# Patient Record
Sex: MALE | Race: WHITE | NOT HISPANIC OR LATINO | Employment: OTHER | URBAN - METROPOLITAN AREA
[De-identification: names, ages, dates, MRNs, and addresses within clinical notes are randomized per-mention and may not be internally consistent; named-entity substitution may affect disease eponyms.]

---

## 2023-09-22 DIAGNOSIS — D35.2 PITUITARY MACROADENOMA: Primary | ICD-10-CM

## 2023-09-28 ENCOUNTER — TELEPHONE (OUTPATIENT)
Dept: NEUROSURGERY | Facility: CLINIC | Age: 73
End: 2023-09-28
Payer: MEDICARE

## 2023-09-28 NOTE — TELEPHONE ENCOUNTER
The patient needs formal eye examination with VF testing.  Try to get that quick.  He will need to see Jackson soon, but try for after the eye examination.  His family physician did the pituitary labwork.

## 2023-10-03 DIAGNOSIS — D35.2 PITUITARY MACROADENOMA: Primary | ICD-10-CM

## 2023-10-03 NOTE — TELEPHONE ENCOUNTER
Patient does yearly eye exams with Dr. Guillen in [place] [phone] # [phone].  He is already scheduled for his yearly eye exam on 11/13/23 and they can add the visual field then.  I will fax them the order.  Insurance wont pay for him to go sooner.  Also he would like to do a virtual appointment with Dr. Jackson.  Is this ok and can it be scheduled after his eye exam on 11/13/23?  Please advise...BH

## 2023-10-03 NOTE — TELEPHONE ENCOUNTER
We need them to interpret the VF, not just send the diagram.    Will have to ask Dr. Jackson about TM.  I can send him a staff message.   Resulted

## 2023-10-25 NOTE — TELEPHONE ENCOUNTER
I was able to schedule the VF for 11/13/23 at 1:30.  Due to transportation the patient can not go sooner.  Dr. Jackson was fine with doing a telemed visit for the first appointment.  I scheduled him for a telemed visit with Dr. Jackson on 11/22/23 at 10:00.  I walked him through the telemed process through the [facility], but he will need a refresher the day of.  I also SAWANT for Dr. Gay's nurse to fax over the MRI report.

## 2023-11-16 RX ORDER — AMLODIPINE BESYLATE 5 MG/1
5 TABLET ORAL EVERY MORNING
COMMUNITY
Start: 2023-09-11

## 2023-11-16 RX ORDER — LOSARTAN POTASSIUM 100 MG/1
100 TABLET ORAL
COMMUNITY
Start: 2023-09-07

## 2023-11-16 RX ORDER — ATORVASTATIN CALCIUM 80 MG/1
80 TABLET, FILM COATED ORAL NIGHTLY
COMMUNITY
Start: 2023-09-07

## 2023-11-16 RX ORDER — METOPROLOL SUCCINATE 25 MG/1
25 TABLET, EXTENDED RELEASE ORAL
COMMUNITY
Start: 2023-09-07

## 2023-11-16 RX ORDER — MECLIZINE HYDROCHLORIDE 25 MG/1
25 TABLET ORAL
COMMUNITY
Start: 2023-06-04

## 2023-11-22 ENCOUNTER — OFFICE VISIT (OUTPATIENT)
Dept: NEUROSURGERY | Facility: CLINIC | Age: 73
End: 2023-11-22
Payer: MEDICARE

## 2023-11-22 VITALS — WEIGHT: 175 LBS | BODY MASS INDEX: 25.05 KG/M2 | HEIGHT: 70 IN

## 2023-11-22 DIAGNOSIS — D35.2 PITUITARY MACROADENOMA: Primary | ICD-10-CM

## 2023-11-22 PROCEDURE — 99204 OFFICE O/P NEW MOD 45 MIN: CPT | Mod: 95,,, | Performed by: STUDENT IN AN ORGANIZED HEALTH CARE EDUCATION/TRAINING PROGRAM

## 2023-11-22 PROCEDURE — 99204 PR OFFICE/OUTPT VISIT, NEW, LEVL IV, 45-59 MIN: ICD-10-PCS | Mod: 95,,, | Performed by: STUDENT IN AN ORGANIZED HEALTH CARE EDUCATION/TRAINING PROGRAM

## 2023-11-22 RX ORDER — ZOLPIDEM TARTRATE 10 MG/1
10 TABLET ORAL NIGHTLY
COMMUNITY
Start: 2023-10-30

## 2023-12-26 NOTE — PROGRESS NOTES
[facility]  History & Physical  Neurosurgery      Zeke Mayfield   [id number]   1950     This is a telemedicine note.  The patient was treated using telemedicine with real-time audio and video, according to [facility] protocols.  I, a distant provider, conducted the visit from my office.  The patient participated in the visit at their home in [place].  I am licensed in the state where the patient stated they are located.  The patient (or patient's representative) stated that they understood and accepted the privacy and security risks today information other location.     SUBJECTIVE:     Chief Complaint:  Incidental finding pituitary lesion    History of Present Illness:   Patient is a 73 y.o. male seen today for pituitary lesion.  He had an episode of dizziness while getting the mail back in June.  Full workup was done including MRI brain.  Pituitary lesion was found.  Denies any headaches, visual disturbances or new episodes of dizziness.  He feels his balance is off at times.  Saw an eye doctor for formal visual field assessment and is scheduled to see them back in 4 months.  Establishing care with Endocrine in December.    Past Medical History:   Diagnosis Date    Basal cell carcinoma     Cataracts, bilateral     Epididymitis     HTN (hypertension)     Hyperlipidemia     Insomnia     Left retinal detachment     Tobacco dependence       Past Surgical History:   Procedure Laterality Date    CATARACT EXTRACTION, BILATERAL      INGUINAL HERNIA REPAIR Left 2009    KNEE ARTHROSCOPY Left 2004    NASAL SEPTUM SURGERY      REPAIR, ANEURYSM, ARTERY, CELIAC, HEPATIC, MESENTERIC, OR RENAL      REPAIR, RETINAL DETACHMENT Left     SHOULDER SURGERY Left 2004    VENTRAL HERNIA REPAIR Left 2009      Social History     Tobacco Use    Smoking status: Never    Smokeless tobacco: Never   Substance Use Topics    Alcohol use: Not on file      Family History   Problem Relation Age of Onset    Heart disease Father  "    Hypertension Father     Hyperlipidemia Sister     Aortic aneurysm Sister       Review of patient's allergies indicates:  No Known Allergies     Current Outpatient Medications   Medication Instructions    amLODIPine (NORVASC) 5 mg, Oral, Every morning    atorvastatin (LIPITOR) 80 mg, Oral, Nightly    losartan (COZAAR) 100 mg, Oral    meclizine (ANTIVERT) 25 mg, Oral    metoprolol succinate (TOPROL-XL) 25 mg, Oral    zolpidem (AMBIEN) 10 mg, Oral, Nightly      Review of Systems  12 point review of systems conducted, negative except as stated in the history of present illness. See HPI for details.    OBJECTIVE:     Visit Vitals  Ht 5' 10" (1.778 m)   Wt 79.4 kg (175 lb)   BMI 25.11 kg/m²      Physical exam was deferred    Imaging:  All pertinent neuroimaging independently reviewed. These findings were discussed in detail with the patient.     MRI pituitary from December 18, 2023 demonstrates grossly stable pituitary mass when compared to June 2023 causing mass effect on optic chiasm.  There is leftward displacement of the infundibulum and partial encasement of the right distal internal carotid artery.    I reviewed visual field study from October 30th and I do not appreciate any gross visual field deficits.    DIAGNOSES:       ICD-10-CM ICD-9-CM   1. Pituitary macroadenoma  D35.2 227.3     ASSESSMENT/PLAN:     73-year-old male incidentally found to have a pituitary lesion.  Awaiting assessment by endocrinology.  Was seen by Ophthalmology and they found no visual field deficits.  We discussed pituitary tumors in general and the natural history.  I recommended close monitoring with repeat MRI pituitary with and with the contrast in 3 months.  He is scheduled for repeat visual fields in about 4 months and I will see him in the clinic afterwards.  He knows to call in the meantime with any questions or concerns.    Orders  -     MRI Pituitary W W/O Contrast; Future; Expected date: 11/22/2023    Follow up in about 4 " months (around 3/22/2024) for with MD, Follow-up, To review imaging.    Marquis Jackson MD  Neurosurgery  [facility]     45 minutes were personally spent on this visit including my review of available records from referring physician, prior imaging, comprehensive physical and neurologic examination and discussion with the patient.     Disclaimer:  This note is prepared using voice recognition software and as such is likely to have errors despite attempts at proofreading.

## 2024-03-21 ENCOUNTER — TELEPHONE (OUTPATIENT)
Dept: NEUROSURGERY | Facility: CLINIC | Age: 74
End: 2024-03-21
Payer: MEDICARE

## 2024-03-21 NOTE — TELEPHONE ENCOUNTER
I called pt to advise about his MRI B and visual field testing being done before his virtual appt 3/25/24 with SM. Pt stated he did get his [facility] B done [facility] in [place] at the end of February. Pt stated he was supposed to mail a disc of the images but he had forgotten. I gave pt the address to mail the disc to office. I also asked pt about his appt for his visual field testing on 3/7/24 at [facility] [place]. Pt stated he cancelled that appointment because of the drops they use he wouldn't be able to drive himself. I advised patient he would need that to be done before virtual appointment per . Pt then stated he was going to call before his appointment on Monday to cancel . I advised patient about rescheduling his appt and pt stated he didn't want to reschedule at this time.